# Patient Record
Sex: FEMALE | Race: WHITE | NOT HISPANIC OR LATINO | Employment: STUDENT | URBAN - METROPOLITAN AREA
[De-identification: names, ages, dates, MRNs, and addresses within clinical notes are randomized per-mention and may not be internally consistent; named-entity substitution may affect disease eponyms.]

---
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
## 2025-01-24 ENCOUNTER — OFFICE VISIT (OUTPATIENT)
Dept: URGENT CARE | Facility: CLINIC | Age: 25
End: 2025-01-24
Payer: COMMERCIAL

## 2025-01-24 VITALS
SYSTOLIC BLOOD PRESSURE: 109 MMHG | WEIGHT: 128.75 LBS | OXYGEN SATURATION: 100 % | TEMPERATURE: 98 F | HEIGHT: 67 IN | HEART RATE: 112 BPM | BODY MASS INDEX: 20.21 KG/M2 | DIASTOLIC BLOOD PRESSURE: 68 MMHG | RESPIRATION RATE: 18 BRPM

## 2025-01-24 DIAGNOSIS — Z20.2 STD EXPOSURE: Primary | ICD-10-CM

## 2025-01-24 PROCEDURE — 81515 NFCT DS BV&VAGINITIS DNA ALG: CPT | Performed by: FAMILY MEDICINE

## 2025-01-24 PROCEDURE — 99203 OFFICE O/P NEW LOW 30 MIN: CPT | Mod: S$GLB,,, | Performed by: FAMILY MEDICINE

## 2025-01-24 PROCEDURE — 87491 CHLMYD TRACH DNA AMP PROBE: CPT | Performed by: FAMILY MEDICINE

## 2025-01-24 NOTE — PROGRESS NOTES
"Subjective:      Patient ID: Ant Mathew is a 24 y.o. female.    Vitals:  height is 5' 7" (1.702 m) and weight is 58.4 kg (128 lb 12 oz). Her tympanic temperature is 98.4 °F (36.9 °C). Her blood pressure is 109/68 and her pulse is 112 (abnormal). Her respiration is 18 and oxygen saturation is 100%.     Chief Complaint: STD Concerns    25 yo female patient presents for testing of STDs. Sexually active.  Patient is asymptomatic. No specific concern.     Other  This is a new problem. Pertinent negatives include no abdominal pain, chills, myalgias, nausea, vomiting or weakness.       Constitution: Negative. Negative for chills.   HENT: Negative.     Cardiovascular: Negative.    Eyes: Negative.    Respiratory: Negative.     Gastrointestinal: Negative.  Negative for abdominal pain, nausea and vomiting.   Genitourinary: Negative.    Musculoskeletal: Negative.  Negative for muscle ache.   Skin: Negative.    Neurological: Negative.       Objective:     Physical Exam   Constitutional: She is oriented to person, place, and time.   HENT:   Nose: Nose normal.   Mouth/Throat: Oropharynx is clear.   Eyes: Conjunctivae are normal.   Neck: Neck supple.   Cardiovascular: Normal rate, regular rhythm, normal heart sounds and normal pulses.   Pulmonary/Chest: Effort normal and breath sounds normal.   Abdominal: Normal appearance. She exhibits no distension. Soft. There is no abdominal tenderness. There is no left CVA tenderness and no right CVA tenderness.   Genitourinary:         Comments: deferred     Neurological: no focal deficit. She is alert and oriented to person, place, and time.   Skin: Skin is warm and no rash.         Comments: Normal turgor   Psychiatric: Her behavior is normal. Mood, judgment and thought content normal.   Nursing note and vitals reviewed.      Assessment:     1. STD exposure        Plan:       STD exposure  -     Vaginosis Screen by DNA Probe; Future; Expected date: 01/24/2025  -     C. trachomatis/N. " gonorrhoeae by AMP DNA [facility]; Vagina      SUMMARY: See hpi.   She just wanted the above testing.  Treat if positive.   Handout on STD prevention given as well.   See below.      Patient Instructions   Thank you for allowing our team to take care of you today.  Sending out vaginal cultures as discussed.  If positive, our office will contact you and initiate treatment.   Attached is general information on STD prevention.  Immediate medical provider evaluation if any symptoms develop.  Followup here as needed.

## 2025-01-25 NOTE — PATIENT INSTRUCTIONS
Thank you for allowing our team to take care of you today.  Sending out vaginal cultures as discussed.  If positive, our office will contact you and initiate treatment.   Attached is general information on STD prevention.  Immediate medical provider evaluation if any symptoms develop.  Followup here as needed.

## 2025-01-28 ENCOUNTER — TELEPHONE (OUTPATIENT)
Dept: URGENT CARE | Facility: CLINIC | Age: 25
End: 2025-01-28
Payer: COMMERCIAL

## 2025-01-28 DIAGNOSIS — B37.31 CANDIDAL VULVOVAGINITIS: ICD-10-CM

## 2025-01-28 DIAGNOSIS — B96.89 BV (BACTERIAL VAGINOSIS): Primary | ICD-10-CM

## 2025-01-28 DIAGNOSIS — N76.0 BV (BACTERIAL VAGINOSIS): Primary | ICD-10-CM

## 2025-01-28 LAB
BACTERIAL VAGINOSIS DNA: DETECTED
C TRACH DNA SPEC QL NAA+PROBE: NOT DETECTED
CANDIDA GLABRATA/KRUSEI: NOT DETECTED
CANDIDA RRNA VAG QL PROBE: DETECTED
N GONORRHOEA DNA SPEC QL NAA+PROBE: NOT DETECTED
TRICHOMONAS VAGINALIS: NOT DETECTED

## 2025-01-28 RX ORDER — METRONIDAZOLE 500 MG/1
500 TABLET ORAL EVERY 12 HOURS
Qty: 14 TABLET | Refills: 0 | Status: SHIPPED | OUTPATIENT
Start: 2025-01-28 | End: 2025-02-04

## 2025-01-28 RX ORDER — FLUCONAZOLE 150 MG/1
150 TABLET ORAL DAILY
Qty: 1 TABLET | Refills: 0 | Status: SHIPPED | OUTPATIENT
Start: 2025-01-28 | End: 2025-01-29

## 2025-01-28 NOTE — TELEPHONE ENCOUNTER
NG/CT negative. (+) BV and Candida on vaginosis screen. Will send diflucan and flagyl to pharmacy on file. [employer] [facility] message sent.    Rufina Padilla PA-C    Results for orders placed or performed in visit on 01/24/25   C. trachomatis/N. gonorrhoeae by AMP DNA [facility]; Vagina    Collection Time: 01/24/25  6:14 PM   Result Value Ref Range    Chlamydia, Amplified DNA Not Detected Not Detected    N gonorrhoeae, amplified DNA Not Detected Not Detected   Vaginosis Screen by DNA Probe    Collection Time: 01/24/25  6:14 PM   Result Value Ref Range    Bacterial vaginosis DNA Detected (A) Not Detected    Candida sp Detected (A) Negative    Candida glabrata/krusei Not Detected Not Detected    Trichomonas vaginalis Not Detected Not Detected